# Patient Record
Sex: MALE
[De-identification: names, ages, dates, MRNs, and addresses within clinical notes are randomized per-mention and may not be internally consistent; named-entity substitution may affect disease eponyms.]

---

## 2023-07-26 ENCOUNTER — NURSE TRIAGE (OUTPATIENT)
Dept: OTHER | Facility: CLINIC | Age: 48
End: 2023-07-26

## 2023-07-26 NOTE — TELEPHONE ENCOUNTER
Location of patient: [address] call from Adam at [facility]; Patient with [employer] Complaint requesting to establish care with [facility]. primary care. Limited triage, unable to speak with pt. Pt's fiance called regarding pt. Able to verify 2 pt identifiers. Pt currently at work. Daun Sheldon call back # [phone]    Subjective: Caller states Sharp pain under scrotum and blood in urine for a couple months    Current Symptoms: erectile dysfunction for a couple months, back pain. Denies n/v. Denies swelling. Hx of being tired and stress. Per family member inc frequency of urination. Painful urination when first wakes up. Denies signs of infections    Dad hx of prostate cx    Onset: a couple months      Pain Severity: sharp, aching; constant. Inc with leg movement      What has been tried: none      Recommended disposition: Go to [facility] or [id number] [place] [address] advice provided, patient verbalizes understanding; denies any other questions or concerns; instructed to call back for any new or worsening symptoms. Writer provided warm transfer to [facility] at [facility] ([facility]) for scheduling    Attention Provider: Thank you for allowing me to participate in the care of your patient. The patient was connected to triage in response to information provided to the [facility]. Please do not respond through this encounter as the response is not directed to a shared pool.       Reason for Disposition   Scrotum looks infected (e.g., draining sore, ulcer, red rash)    Protocols used: Scrotal Pain-ADULT-[place]

## 2023-08-03 ENCOUNTER — OFFICE VISIT (OUTPATIENT)
Dept: FAMILY MEDICINE CLINIC | Age: 48
End: 2023-08-03

## 2023-08-03 VITALS
DIASTOLIC BLOOD PRESSURE: 74 MMHG | WEIGHT: 218.6 LBS | BODY MASS INDEX: 32.38 KG/M2 | SYSTOLIC BLOOD PRESSURE: 116 MMHG | OXYGEN SATURATION: 96 % | HEART RATE: 81 BPM | HEIGHT: 69 IN

## 2023-08-03 DIAGNOSIS — R06.83 SNORES: ICD-10-CM

## 2023-08-03 DIAGNOSIS — G47.10 HYPERSOMNIA: ICD-10-CM

## 2023-08-03 DIAGNOSIS — R22.2 MASS OF SKIN OF BACK: ICD-10-CM

## 2023-08-03 DIAGNOSIS — R36.1 HEMATOSPERMIA: ICD-10-CM

## 2023-08-03 DIAGNOSIS — N50.82 SCROTAL PAIN: Primary | ICD-10-CM

## 2023-08-03 DIAGNOSIS — R39.198 DECREASED URINE STREAM: ICD-10-CM

## 2023-08-03 DIAGNOSIS — Z80.42 FH: PROSTATE CANCER: ICD-10-CM

## 2023-08-03 DIAGNOSIS — F43.21 ADJUSTMENT DISORDER WITH DEPRESSED MOOD: ICD-10-CM

## 2023-08-03 LAB
BILIRUBIN, POC: NEGATIVE
BLOOD URINE, POC: NEGATIVE
CLARITY, POC: NORMAL
COLOR, POC: NORMAL
GLUCOSE URINE, POC: NEGATIVE
KETONES, POC: NEGATIVE
LEUKOCYTE EST, POC: NEGATIVE
NITRITE, POC: NEGATIVE
PH, POC: 6.5
PROTEIN, POC: NEGATIVE
SPECIFIC GRAVITY, POC: 1.02
UROBILINOGEN, POC: 0.2

## 2023-08-03 RX ORDER — SULFAMETHOXAZOLE AND TRIMETHOPRIM 800; 160 MG/1; MG/1
1 TABLET ORAL 2 TIMES DAILY
Qty: 28 TABLET | Refills: 0 | Status: SHIPPED | OUTPATIENT
Start: 2023-08-03 | End: 2023-08-17

## 2023-08-03 SDOH — ECONOMIC STABILITY: HOUSING INSECURITY
IN THE LAST 12 MONTHS, WAS THERE A TIME WHEN YOU DID NOT HAVE A STEADY PLACE TO SLEEP OR SLEPT IN A SHELTER (INCLUDING NOW)?: NO

## 2023-08-03 SDOH — ECONOMIC STABILITY: FOOD INSECURITY: WITHIN THE PAST 12 MONTHS, YOU WORRIED THAT YOUR FOOD WOULD RUN OUT BEFORE YOU GOT MONEY TO BUY MORE.: NEVER TRUE

## 2023-08-03 SDOH — ECONOMIC STABILITY: INCOME INSECURITY: HOW HARD IS IT FOR YOU TO PAY FOR THE VERY BASICS LIKE FOOD, HOUSING, MEDICAL CARE, AND HEATING?: NOT HARD AT ALL

## 2023-08-03 SDOH — ECONOMIC STABILITY: FOOD INSECURITY: WITHIN THE PAST 12 MONTHS, THE FOOD YOU BOUGHT JUST DIDN'T LAST AND YOU DIDN'T HAVE MONEY TO GET MORE.: NEVER TRUE

## 2023-08-03 ASSESSMENT — ANXIETY QUESTIONNAIRES
2. NOT BEING ABLE TO STOP OR CONTROL WORRYING: 3
3. WORRYING TOO MUCH ABOUT DIFFERENT THINGS: 3
4. TROUBLE RELAXING: 3
1. FEELING NERVOUS, ANXIOUS, OR ON EDGE: 2
GAD7 TOTAL SCORE: 17
5. BEING SO RESTLESS THAT IT IS HARD TO SIT STILL: 0
IF YOU CHECKED OFF ANY PROBLEMS ON THIS QUESTIONNAIRE, HOW DIFFICULT HAVE THESE PROBLEMS MADE IT FOR YOU TO DO YOUR WORK, TAKE CARE OF THINGS AT HOME, OR GET ALONG WITH OTHER PEOPLE: SOMEWHAT DIFFICULT
7. FEELING AFRAID AS IF SOMETHING AWFUL MIGHT HAPPEN: 3
6. BECOMING EASILY ANNOYED OR IRRITABLE: 3

## 2023-08-03 ASSESSMENT — PATIENT HEALTH QUESTIONNAIRE - PHQ9
8. MOVING OR SPEAKING SO SLOWLY THAT OTHER PEOPLE COULD HAVE NOTICED. OR THE OPPOSITE, BEING SO FIGETY OR RESTLESS THAT YOU HAVE BEEN MOVING AROUND A LOT MORE THAN USUAL: 0
10. IF YOU CHECKED OFF ANY PROBLEMS, HOW DIFFICULT HAVE THESE PROBLEMS MADE IT FOR YOU TO DO YOUR WORK, TAKE CARE OF THINGS AT HOME, OR GET ALONG WITH OTHER PEOPLE: 3
SUM OF ALL RESPONSES TO PHQ QUESTIONS 1-9: 14
7. TROUBLE CONCENTRATING ON THINGS, SUCH AS READING THE NEWSPAPER OR WATCHING TELEVISION: 2
SUM OF ALL RESPONSES TO PHQ9 QUESTIONS 1 & 2: 4
5. POOR APPETITE OR OVEREATING: 2
9. THOUGHTS THAT YOU WOULD BE BETTER OFF DEAD, OR OF HURTING YOURSELF: 0
4. FEELING TIRED OR HAVING LITTLE ENERGY: 3
1. LITTLE INTEREST OR PLEASURE IN DOING THINGS: 2
SUM OF ALL RESPONSES TO PHQ QUESTIONS 1-9: 14
SUM OF ALL RESPONSES TO PHQ QUESTIONS 1-9: 14
6. FEELING BAD ABOUT YOURSELF - OR THAT YOU ARE A FAILURE OR HAVE LET YOURSELF OR YOUR FAMILY DOWN: 0
3. TROUBLE FALLING OR STAYING ASLEEP: 3
SUM OF ALL RESPONSES TO PHQ QUESTIONS 1-9: 14
2. FEELING DOWN, DEPRESSED OR HOPELESS: 2

## 2023-08-03 NOTE — PROGRESS NOTES
feel a sharp pain in testicle  If sits wrong will hurt- worse with pressure  Can take a while to go to the bathroom  Stream is weak and split  Sometimes feels like he cannot empty, sometimes has to push  Denies concerns for STDs    Hematuria and blood in semen  Has not noticed it for a while in urine  No discharge from penis    Knot left lower back  X a while  Hurts to touch- pain worse  Dx with a fatty tissue  Is getting bigger    Before October- had a really bad separation- drilled with a lot of stuff, son is going through issues (20 yo- ADHD, autistic- hanging out with people that have him doing drugs)  Always short tempered  Can be irritable  Does not really feel sad- more aggravated  Sometimes does not want to do things  Can avoid  Can feel overwhelmed    Hypersomnia- sometimes naps  Snores  GF states will stop breathing    Review of Systems       Objective   Physical Exam  Vitals reviewed. Constitutional:       Appearance: Normal appearance. HENT:      Head: Normocephalic. Right Ear: External ear normal.      Left Ear: External ear normal.      Nose: Nose normal.   Cardiovascular:      Rate and Rhythm: Normal rate and regular rhythm. Pulses: Normal pulses. Heart sounds: Normal heart sounds, S1 normal and S2 normal.   Pulmonary:      Effort: Pulmonary effort is normal.      Breath sounds: Normal breath sounds and air entry. Abdominal:      Palpations: Abdomen is soft. Tenderness: There is no abdominal tenderness. There is no right CVA tenderness or left CVA tenderness. Hernia: There is no hernia in the left inguinal area or right inguinal area. Genitourinary:     Penis: Normal and circumcised. Testes:         Right: Tenderness present. Musculoskeletal:      Right lower leg: No edema. Left lower leg: No edema. Skin:            Comments: Mass present   Neurological:      Mental Status: He is alert.    Psychiatric:         Mood and Affect: Mood normal.     An

## 2023-08-05 LAB
ALBUMIN SERPL-MCNC: 4.2 G/DL (ref 3.5–5.7)
ALP BLD-CCNC: 94 IU/L (ref 35–135)
ALT SERPL-CCNC: 32 IU/L (ref 10–60)
ANION GAP SERPL CALCULATED.3IONS-SCNC: 9 MMOL/L (ref 4–16)
AST SERPL-CCNC: 25 IU/L (ref 10–40)
BACTERIA UR CULT: NORMAL
BILIRUB SERPL-MCNC: 0.7 MG/DL (ref 0–1.2)
BUN BLDV-MCNC: 19 MG/DL (ref 8–26)
CALCIUM SERPL-MCNC: 8.9 MG/DL (ref 8.5–10.4)
CHLORIDE BLD-SCNC: 103 MEQ/L (ref 98–111)
CHOLESTEROL, TOTAL: 259 MG/DL
CO2: 27 MMOL/L (ref 21–31)
CREAT SERPL-MCNC: 1.23 MG/DL (ref 0.7–1.3)
EGFR (CKD-EPI): 72 ML/MIN/1.73 M2
ESTIMATED AVERAGE GLUCOSE: 108 MG/DL
GLUCOSE BLD-MCNC: 97 MG/DL (ref 70–99)
HBA1C MFR BLD: 5.4 % (ref 4.2–5.6)
HCT VFR BLD CALC: 47.1 % (ref 40–50)
HDLC SERPL-MCNC: 27 MG/DL
HEMOGLOBIN: 16.1 G/DL (ref 13.5–16.5)
LDL CHOLESTEROL CALCULATED: 211 MG/DL
MCH RBC QN AUTO: 27.8 PG (ref 27–33)
MCHC RBC AUTO-ENTMCNC: 34.3 G/DL (ref 32–36)
MCV RBC AUTO: 81.2 FL (ref 82–97)
NONHDLC SERPL-MCNC: 232 MG/DL
PDW BLD-RTO: 13.7 % (ref 12.3–17)
PLATELET # BLD: 191 THOU/MCL (ref 140–375)
PMV BLD AUTO: 7.8 FL (ref 7.4–11.5)
POTASSIUM SERPL-SCNC: 4.5 MEQ/L (ref 3.6–5.1)
PROSTATE SPECIFIC ANTIGEN: 0.66 NG/ML
RBC # BLD: 5.8 MIL/MCL (ref 4.4–5.8)
SODIUM BLD-SCNC: 139 MEQ/L (ref 135–145)
TOTAL PROTEIN: 7.1 G/DL (ref 6–8)
TRIGL SERPL-MCNC: 107 MG/DL
TSH ULTRASENSITIVE: 0.76 MCIU/ML (ref 0.27–4.2)
VITAMIN B-12: 298 PG/ML (ref 232–1245)
VITAMIN D 25-HYDROXY: 41.1 NG/ML (ref 30–150)
WBC # BLD: 4.5 THOU/MCL (ref 3.6–10.5)

## 2023-08-21 DIAGNOSIS — R36.1 HEMATOSPERMIA: ICD-10-CM

## 2023-08-21 DIAGNOSIS — N50.82 SCROTAL PAIN: Primary | ICD-10-CM

## 2023-08-21 DIAGNOSIS — R39.198 DECREASED URINE STREAM: ICD-10-CM

## 2023-08-26 ENCOUNTER — HOSPITAL ENCOUNTER (OUTPATIENT)
Dept: ULTRASOUND IMAGING | Age: 48
Discharge: HOME | End: 2023-08-26
Payer: COMMERCIAL

## 2023-08-26 DIAGNOSIS — N50.82 SCROTAL PAIN: ICD-10-CM

## 2023-08-26 PROCEDURE — 76870 US EXAM SCROTUM: CPT

## 2023-09-12 ENCOUNTER — TELEPHONE (OUTPATIENT)
Dept: FAMILY MEDICINE CLINIC | Age: 48
End: 2023-09-12

## 2023-09-12 NOTE — TELEPHONE ENCOUNTER
----- Message from Ronald Sneed sent at 9/12/2023 12:04 PM EDT -----  Subject: Message to Provider    QUESTIONS  Information for Provider? Patient's girlfriend [facility] was speaking with   someone earlier in regards to a few of his referrals and got disconnected,   she would like some more info on his referrals and any recommended   doctors, please advise  ---------------------------------------------------------------------------  --------------  Jakub Izaguirre  [phone]; OK to leave message on voicemail  ---------------------------------------------------------------------------  --------------  SCRIPT ANSWERS  Relationship to Patient? Spouse/Partner  Representative Name? Tavares  Is the representative on the Communication Release of Information (BEREKET)   form in Epic?  Yes

## 2023-09-23 LAB — URINE CULTURE, ROUTINE: NORMAL

## 2024-08-06 ENCOUNTER — HOSPITAL ENCOUNTER (EMERGENCY)
Age: 49
Discharge: HOME | End: 2024-08-06
Attending: EMERGENCY MEDICINE
Payer: COMMERCIAL

## 2024-08-06 VITALS
SYSTOLIC BLOOD PRESSURE: 138 MMHG | HEIGHT: 69 IN | WEIGHT: 216.8 LBS | TEMPERATURE: 98.2 F | RESPIRATION RATE: 16 BRPM | DIASTOLIC BLOOD PRESSURE: 98 MMHG | HEART RATE: 86 BPM | BODY MASS INDEX: 32.11 KG/M2 | OXYGEN SATURATION: 96 %

## 2024-08-06 DIAGNOSIS — H61.23 BILATERAL IMPACTED CERUMEN: Primary | ICD-10-CM

## 2024-08-06 DIAGNOSIS — I10 HYPERTENSION, UNSPECIFIED TYPE: ICD-10-CM

## 2024-08-06 DIAGNOSIS — H93.13 TINNITUS OF BOTH EARS: ICD-10-CM

## 2024-08-06 PROCEDURE — 6370000000 HC RX 637 (ALT 250 FOR IP): Performed by: EMERGENCY MEDICINE

## 2024-08-06 PROCEDURE — 99283 EMERGENCY DEPT VISIT LOW MDM: CPT

## 2024-08-06 RX ORDER — SODIUM CHLORIDE 0.9 % (FLUSH) 0.9 %
5-40 SYRINGE (ML) INJECTION PRN
Status: DISCONTINUED | OUTPATIENT
Start: 2024-08-06 | End: 2024-08-06

## 2024-08-06 RX ORDER — SODIUM CHLORIDE 0.9 % (FLUSH) 0.9 %
10 SYRINGE (ML) INJECTION ONCE
Status: DISCONTINUED | OUTPATIENT
Start: 2024-08-06 | End: 2024-08-06

## 2024-08-06 RX ORDER — LISINOPRIL 10 MG/1
10 TABLET ORAL DAILY
Qty: 21 TABLET | Refills: 0 | Status: SHIPPED | OUTPATIENT
Start: 2024-08-06 | End: 2024-08-27

## 2024-08-06 RX ORDER — LISINOPRIL 10 MG/1
10 TABLET ORAL ONCE
Status: COMPLETED | OUTPATIENT
Start: 2024-08-06 | End: 2024-08-06

## 2024-08-06 RX ORDER — SODIUM CHLORIDE 9 MG/ML
INJECTION, SOLUTION INTRAVENOUS PRN
Status: DISCONTINUED | OUTPATIENT
Start: 2024-08-06 | End: 2024-08-06

## 2024-08-06 RX ORDER — SODIUM CHLORIDE 0.9 % (FLUSH) 0.9 %
5-40 SYRINGE (ML) INJECTION EVERY 12 HOURS SCHEDULED
Status: DISCONTINUED | OUTPATIENT
Start: 2024-08-06 | End: 2024-08-06

## 2024-08-06 RX ADMIN — LISINOPRIL 10 MG: 10 TABLET ORAL at 21:55

## 2024-08-06 ASSESSMENT — PAIN - FUNCTIONAL ASSESSMENT: PAIN_FUNCTIONAL_ASSESSMENT: NONE - DENIES PAIN

## 2024-08-06 NOTE — ED TRIAGE NOTES
Pt c/o tinnitus and a feeling of fullness in both ears since 10am today. 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 Pt denies any pain at this time.

## 2024-08-07 NOTE — DISCHARGE INSTRUCTIONS
Return to emergency department if headache weakness numbness worsening ringing in the ears or hearing loss worse in any way or any problems.    Use generic earwax softening drops which can be bought over-the-counter.  Use the strap several days in a row then irrigate each ear with warm water using a small bulb syringe that comes with the kit.    Follow-up with family doctor 5 to 7 days for recheck and recheck blood pressure.    Monitor blood pressure closely at home morning and evening and after strenuous or stressful activity and keep a log of these recordings.  Bring this log to outpatient follow-up appointment with PCP.    Follow-up with [facility] in 1 week.  Call in the a.m. for soonest available appointment.    Continue wearing earplugs at all times while working around loud machinery.

## 2024-08-07 NOTE — ED PROVIDER NOTES
lisinopril (PRINIVIL;ZESTRIL) 10 MG tablet Take 1 tablet by mouth daily for 21 doses, Disp-21 tablet, R-0Normal              DISCONTINUED MEDICATIONS:   Discharge Medication List as of 8/6/2024  9:48 PM                 Brea Meléndez DO (electronically signed)         Brea Meléndez DO  08/08/24 1952